# Patient Record
Sex: FEMALE | Race: WHITE | NOT HISPANIC OR LATINO | Employment: FULL TIME | URBAN - METROPOLITAN AREA
[De-identification: names, ages, dates, MRNs, and addresses within clinical notes are randomized per-mention and may not be internally consistent; named-entity substitution may affect disease eponyms.]

---

## 2021-02-17 ENCOUNTER — IMMUNIZATION (OUTPATIENT)
Dept: VACCINE CLINIC | Facility: HOSPITAL | Age: 46
End: 2021-02-17

## 2021-02-17 PROCEDURE — 91300 HC SARSCOV02 VAC 30MCG/0.3ML IM: CPT | Performed by: INTERNAL MEDICINE

## 2021-02-17 PROCEDURE — 0001A: CPT | Performed by: INTERNAL MEDICINE

## 2021-03-10 ENCOUNTER — IMMUNIZATION (OUTPATIENT)
Dept: VACCINE CLINIC | Facility: HOSPITAL | Age: 46
End: 2021-03-10

## 2021-03-10 PROCEDURE — 91300 HC SARSCOV02 VAC 30MCG/0.3ML IM: CPT | Performed by: INTERNAL MEDICINE

## 2021-03-10 PROCEDURE — 0002A: CPT | Performed by: INTERNAL MEDICINE

## 2021-09-28 ENCOUNTER — OFFICE VISIT (OUTPATIENT)
Dept: FAMILY MEDICINE CLINIC | Facility: CLINIC | Age: 46
End: 2021-09-28

## 2021-09-28 VITALS
WEIGHT: 121.4 LBS | SYSTOLIC BLOOD PRESSURE: 138 MMHG | HEART RATE: 63 BPM | OXYGEN SATURATION: 99 % | TEMPERATURE: 98.6 F | BODY MASS INDEX: 20.73 KG/M2 | DIASTOLIC BLOOD PRESSURE: 90 MMHG | HEIGHT: 64 IN

## 2021-09-28 DIAGNOSIS — G43.909 MIGRAINE SYNDROME: ICD-10-CM

## 2021-09-28 DIAGNOSIS — H57.02 ANISOCORIA: ICD-10-CM

## 2021-09-28 DIAGNOSIS — Z30.41 ENCOUNTER FOR SURVEILLANCE OF CONTRACEPTIVE PILLS: ICD-10-CM

## 2021-09-28 DIAGNOSIS — Z86.39 HISTORY OF VITAMIN D DEFICIENCY: Primary | ICD-10-CM

## 2021-09-28 PROBLEM — Z30.40 CONTRACEPTIVE SURVEILLANCE: Status: ACTIVE | Noted: 2017-03-31

## 2021-09-28 PROCEDURE — 99204 OFFICE O/P NEW MOD 45 MIN: CPT | Performed by: FAMILY MEDICINE

## 2021-09-28 RX ORDER — ACETAMINOPHEN 325 MG/1
650 TABLET ORAL
COMMUNITY
Start: 2021-08-14

## 2021-09-28 RX ORDER — AMLODIPINE BESYLATE 5 MG/1
5 TABLET ORAL DAILY
COMMUNITY
Start: 2021-08-12

## 2021-09-28 RX ORDER — MULTIVIT-MIN/IRON/FOLIC ACID/K 18-600-40
CAPSULE ORAL
COMMUNITY

## 2021-09-28 RX ORDER — SUMATRIPTAN 50 MG/1
50 TABLET, FILM COATED ORAL
COMMUNITY
Start: 2020-12-31

## 2021-09-28 RX ORDER — TOPIRAMATE 100 MG/1
TABLET, FILM COATED ORAL
COMMUNITY
Start: 2021-08-16

## 2021-09-28 RX ORDER — LEVONORGESTREL AND ETHINYL ESTRADIOL 0.1-0.02MG
KIT ORAL
COMMUNITY

## 2021-09-28 RX ORDER — VALACYCLOVIR HYDROCHLORIDE 500 MG/1
TABLET, FILM COATED ORAL
COMMUNITY
Start: 2021-08-08

## 2021-09-28 NOTE — PROGRESS NOTES
"Subjective   Shannen Oseguera is a 46 y.o. female.     Chief Complaint   Patient presents with   • Establish Care       History of Present Illness   Vitamin D deficiency-she is on replacement.    Migraines-is on preventative with Topamax and amlodipine and uses Imitrex as needed.  Follows with neurology.  Has 1 headache a month or less now.    Gets well visits and contraceptives with GYN and is up-to-date. She just had her mamm.     Is being followed by ophthalmology.     The following portions of the patient's history were reviewed and updated as appropriate: allergies, current medications, past family history, past medical history, past social history, past surgical history and problem list.    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       Past Surgical History:   Procedure Laterality Date   • NO PAST SURGERIES         Family History   Problem Relation Age of Onset   • Cancer Mother         Breast cancer   • Hyperlipidemia Mother    • Other Father         Epilepsy       Social History     Socioeconomic History   • Marital status:      Spouse name: Not on file   • Number of children: Not on file   • Years of education: Not on file   • Highest education level: Not on file   Tobacco Use   • Smoking status: Never Smoker   • Smokeless tobacco: Never Used   Substance and Sexual Activity   • Alcohol use: Not Currently     Alcohol/week: 0.0 standard drinks     Comment: Very rarely and if so it is one drinkto be social.   • Drug use: Never   • Sexual activity: Yes     Partners: Male     Birth control/protection: OCP       Review of Systems   Constitutional: Negative for fever.   Respiratory: Negative for shortness of breath.        Objective   Visit Vitals  /90 (BP Location: Left arm, Patient Position: Sitting)   Pulse 63   Temp 98.6 °F (37 °C)   Ht 162.6 cm (64\")   Wt 55.1 kg (121 lb 6.4 oz) "   SpO2 99%   BMI 20.84 kg/m²     Body mass index is 20.84 kg/m².  Physical Exam  Constitutional:       Appearance: Normal appearance. She is well-developed.   Cardiovascular:      Rate and Rhythm: Normal rate and regular rhythm.      Heart sounds: Normal heart sounds.   Pulmonary:      Effort: Pulmonary effort is normal.      Breath sounds: Normal breath sounds.   Musculoskeletal:         General: No swelling. Normal range of motion.   Skin:     General: Skin is warm and dry.      Findings: No rash.   Neurological:      General: No focal deficit present.      Mental Status: She is alert and oriented to person, place, and time.   Psychiatric:         Mood and Affect: Mood normal.         Behavior: Behavior normal.           Assessment/Plan   Diagnoses and all orders for this visit:    1. History of vitamin D deficiency (Primary)  -     Comprehensive Metabolic Panel  -     Vitamin D 25 Hydroxy    2. Migraine syndrome    3. Anisocoria    4. Encounter for surveillance of contraceptive pills      Doing well, cont meds, f/u as needed.

## 2021-09-29 LAB
25(OH)D3+25(OH)D2 SERPL-MCNC: 63.7 NG/ML (ref 30–100)
ALBUMIN SERPL-MCNC: 4.2 G/DL (ref 3.5–5.2)
ALBUMIN/GLOB SERPL: 1.8 G/DL
ALP SERPL-CCNC: 73 U/L (ref 39–117)
ALT SERPL-CCNC: 11 U/L (ref 1–33)
AST SERPL-CCNC: 15 U/L (ref 1–32)
BILIRUB SERPL-MCNC: 0.3 MG/DL (ref 0–1.2)
BUN SERPL-MCNC: 14 MG/DL (ref 6–20)
BUN/CREAT SERPL: 13.7 (ref 7–25)
CALCIUM SERPL-MCNC: 9 MG/DL (ref 8.6–10.5)
CHLORIDE SERPL-SCNC: 111 MMOL/L (ref 98–107)
CO2 SERPL-SCNC: 21.8 MMOL/L (ref 22–29)
CREAT SERPL-MCNC: 1.02 MG/DL (ref 0.57–1)
GLOBULIN SER CALC-MCNC: 2.4 GM/DL
GLUCOSE SERPL-MCNC: 84 MG/DL (ref 65–99)
POTASSIUM SERPL-SCNC: 3.8 MMOL/L (ref 3.5–5.2)
PROT SERPL-MCNC: 6.6 G/DL (ref 6–8.5)
SODIUM SERPL-SCNC: 143 MMOL/L (ref 136–145)

## 2022-11-14 ENCOUNTER — HOSPITAL ENCOUNTER (OUTPATIENT)
Dept: GENERAL RADIOLOGY | Facility: HOSPITAL | Age: 47
Discharge: HOME | End: 2022-11-14
Admitting: UROLOGY

## 2022-11-14 DIAGNOSIS — N20.0 CALCULUS, RENAL: ICD-10-CM

## 2022-11-14 PROCEDURE — 74018 RADEX ABDOMEN 1 VIEW: CPT

## 2022-12-05 ENCOUNTER — LAB (OUTPATIENT)
Dept: LAB | Facility: HOSPITAL | Age: 47
End: 2022-12-05

## 2022-12-05 ENCOUNTER — TRANSCRIBE ORDERS (OUTPATIENT)
Dept: LAB | Facility: HOSPITAL | Age: 47
End: 2022-12-05

## 2022-12-05 DIAGNOSIS — R31.21 ASYMPTOMATIC MICROSCOPIC HEMATURIA: ICD-10-CM

## 2022-12-05 DIAGNOSIS — R31.21 ASYMPTOMATIC MICROSCOPIC HEMATURIA: Primary | ICD-10-CM

## 2022-12-05 LAB
ANION GAP SERPL CALCULATED.3IONS-SCNC: 7.2 MMOL/L (ref 5–15)
BASOPHILS # BLD AUTO: 0.05 10*3/MM3 (ref 0–0.2)
BASOPHILS NFR BLD AUTO: 0.6 % (ref 0–1.5)
BUN SERPL-MCNC: 13 MG/DL (ref 6–20)
BUN/CREAT SERPL: 12.6 (ref 7–25)
CALCIUM SPEC-SCNC: 8.9 MG/DL (ref 8.6–10.5)
CHLORIDE SERPL-SCNC: 107 MMOL/L (ref 98–107)
CO2 SERPL-SCNC: 24.8 MMOL/L (ref 22–29)
CREAT SERPL-MCNC: 1.03 MG/DL (ref 0.57–1)
DEPRECATED RDW RBC AUTO: 39.4 FL (ref 37–54)
EGFRCR SERPLBLD CKD-EPI 2021: 66.8 ML/MIN/1.73
EOSINOPHIL # BLD AUTO: 0.14 10*3/MM3 (ref 0–0.4)
EOSINOPHIL NFR BLD AUTO: 1.7 % (ref 0.3–6.2)
ERYTHROCYTE [DISTWIDTH] IN BLOOD BY AUTOMATED COUNT: 11.6 % (ref 12.3–15.4)
GLUCOSE SERPL-MCNC: 88 MG/DL (ref 65–99)
HCT VFR BLD AUTO: 40.2 % (ref 34–46.6)
HGB BLD-MCNC: 13.5 G/DL (ref 12–15.9)
IMM GRANULOCYTES # BLD AUTO: 0.02 10*3/MM3 (ref 0–0.05)
IMM GRANULOCYTES NFR BLD AUTO: 0.2 % (ref 0–0.5)
LYMPHOCYTES # BLD AUTO: 2.79 10*3/MM3 (ref 0.7–3.1)
LYMPHOCYTES NFR BLD AUTO: 33.3 % (ref 19.6–45.3)
MCH RBC QN AUTO: 31.5 PG (ref 26.6–33)
MCHC RBC AUTO-ENTMCNC: 33.6 G/DL (ref 31.5–35.7)
MCV RBC AUTO: 93.9 FL (ref 79–97)
MONOCYTES # BLD AUTO: 0.58 10*3/MM3 (ref 0.1–0.9)
MONOCYTES NFR BLD AUTO: 6.9 % (ref 5–12)
NEUTROPHILS NFR BLD AUTO: 4.79 10*3/MM3 (ref 1.7–7)
NEUTROPHILS NFR BLD AUTO: 57.3 % (ref 42.7–76)
NRBC BLD AUTO-RTO: 0 /100 WBC (ref 0–0.2)
PLATELET # BLD AUTO: 266 10*3/MM3 (ref 140–450)
PMV BLD AUTO: 11.8 FL (ref 6–12)
POTASSIUM SERPL-SCNC: 3.5 MMOL/L (ref 3.5–5.2)
RBC # BLD AUTO: 4.28 10*6/MM3 (ref 3.77–5.28)
SODIUM SERPL-SCNC: 139 MMOL/L (ref 136–145)
WBC NRBC COR # BLD: 8.37 10*3/MM3 (ref 3.4–10.8)

## 2022-12-05 PROCEDURE — 36415 COLL VENOUS BLD VENIPUNCTURE: CPT

## 2022-12-05 PROCEDURE — 85025 COMPLETE CBC W/AUTO DIFF WBC: CPT

## 2022-12-05 PROCEDURE — 80048 BASIC METABOLIC PNL TOTAL CA: CPT

## 2022-12-16 ENCOUNTER — HOSPITAL ENCOUNTER (OUTPATIENT)
Dept: GENERAL RADIOLOGY | Facility: HOSPITAL | Age: 47
Discharge: HOME | End: 2022-12-16
Admitting: UROLOGY

## 2022-12-16 DIAGNOSIS — N20.0 CALCULUS, RENAL: ICD-10-CM

## 2022-12-16 PROCEDURE — 74018 RADEX ABDOMEN 1 VIEW: CPT

## 2023-04-28 ENCOUNTER — OFFICE VISIT (OUTPATIENT)
Dept: FAMILY MEDICINE CLINIC | Facility: CLINIC | Age: 48
End: 2023-04-28
Payer: COMMERCIAL

## 2023-04-28 VITALS
OXYGEN SATURATION: 98 % | HEART RATE: 90 BPM | TEMPERATURE: 98.2 F | SYSTOLIC BLOOD PRESSURE: 133 MMHG | DIASTOLIC BLOOD PRESSURE: 83 MMHG | BODY MASS INDEX: 21.46 KG/M2 | WEIGHT: 125 LBS

## 2023-04-28 DIAGNOSIS — H57.02 ANISOCORIA: ICD-10-CM

## 2023-04-28 DIAGNOSIS — Z01.818 PREOP EXAMINATION: Primary | ICD-10-CM

## 2023-04-28 DIAGNOSIS — G43.909 MIGRAINE SYNDROME: ICD-10-CM

## 2023-04-28 DIAGNOSIS — Z86.39 HISTORY OF VITAMIN D DEFICIENCY: ICD-10-CM

## 2023-04-28 PROBLEM — G43.009 MIGRAINE WITHOUT AURA AND WITHOUT STATUS MIGRAINOSUS, NOT INTRACTABLE: Status: ACTIVE | Noted: 2023-01-25

## 2023-04-28 RX ORDER — BROMFENAC SODIUM 0.7 MG/ML
SOLUTION/ DROPS OPHTHALMIC
COMMUNITY
Start: 2023-04-08

## 2023-04-28 RX ORDER — SUMATRIPTAN 50 MG/1
50 TABLET, FILM COATED ORAL
COMMUNITY
Start: 2023-01-25

## 2023-04-28 RX ORDER — TOPIRAMATE 100 MG/1
100 TABLET, FILM COATED ORAL DAILY
COMMUNITY
Start: 2023-01-25

## 2023-04-28 RX ORDER — MOXIFLOXACIN 5 MG/ML
SOLUTION/ DROPS OPHTHALMIC
COMMUNITY
Start: 2023-03-28

## 2023-04-28 RX ORDER — MELATONIN
2000 DAILY
COMMUNITY
End: 2023-04-28

## 2023-04-28 NOTE — PROGRESS NOTES
"Chief Complaint  eye surgery clearance    Subjective        Shannen Oseguera presents to [facility]  History of Present Illness     Is having cataract surgery and working to relax her astigmatism. Scheduled for 5/11/23. Has not had surgery before.     Objective   Vital Signs:  /83 (BP Location: Right arm, Patient Position: Sitting, Cuff Size: Large Adult)   Pulse 90   Temp 98.2 °F (36.8 °C)   Wt 56.7 kg (125 lb)   SpO2 98%   BMI 21.46 kg/m²   Estimated body mass index is 21.46 kg/m² as calculated from the following:    Height as of 9/28/21: 162.6 cm (64\").    Weight as of this encounter: 56.7 kg (125 lb).       BMI is within normal parameters. No other follow-up for BMI required.      Physical Exam  Vitals and nursing note reviewed.   Constitutional:       General: She is not in acute distress.     Appearance: Normal appearance. She is not ill-appearing.   HENT:      Head: Normocephalic and atraumatic.      Nose: Nose normal.      Mouth/Throat:      Mouth: Mucous membranes are moist.   Eyes:      Extraocular Movements: Extraocular movements intact.      Conjunctiva/sclera: Conjunctivae normal.   Cardiovascular:      Rate and Rhythm: Normal rate and regular rhythm.      Heart sounds: Normal heart sounds. No murmur heard.    No gallop.   Pulmonary:      Effort: Pulmonary effort is normal. No respiratory distress.      Breath sounds: Normal breath sounds. No stridor. No wheezing, rhonchi or rales.   Chest:      Chest wall: No tenderness.   Skin:     General: Skin is warm and dry.   Neurological:      General: No focal deficit present.      Mental Status: She is alert and oriented to person, place, and time. Mental status is at baseline.   Psychiatric:         Mood and Affect: Mood normal.         Behavior: Behavior normal.        Result Review :                   Assessment and Plan   Diagnoses and all orders for this visit:    1. Preop examination (Primary)    2. " Anisocoria    3. Migraine syndrome    4. History of vitamin D deficiency    on amlodipine for migraine treatment in combo with topamax. Does not have recorded history of high blood pressure     See scanned copy of surgical clearance paperwork.  Surgical risk calculator - low risk.          Follow Up   No follow-ups on file.  Patient was given instructions and counseling regarding her condition or for health maintenance advice. Please see specific information pulled into the AVS if appropriate.

## 2024-07-15 ENCOUNTER — OFFICE VISIT (OUTPATIENT)
Dept: FAMILY MEDICINE CLINIC | Facility: CLINIC | Age: 49
End: 2024-07-15
Payer: COMMERCIAL

## 2024-07-15 VITALS
TEMPERATURE: 97.7 F | HEART RATE: 77 BPM | HEIGHT: 64 IN | DIASTOLIC BLOOD PRESSURE: 80 MMHG | BODY MASS INDEX: 21.68 KG/M2 | SYSTOLIC BLOOD PRESSURE: 120 MMHG | WEIGHT: 127 LBS | OXYGEN SATURATION: 99 %

## 2024-07-15 DIAGNOSIS — Z23 IMMUNIZATION DUE: ICD-10-CM

## 2024-07-15 DIAGNOSIS — Z00.00 HEALTHCARE MAINTENANCE: Primary | ICD-10-CM

## 2024-07-15 DIAGNOSIS — Z13.6 SCREENING FOR CARDIOVASCULAR CONDITION: ICD-10-CM

## 2024-07-15 DIAGNOSIS — Z86.39 HISTORY OF VITAMIN D DEFICIENCY: ICD-10-CM

## 2024-07-15 DIAGNOSIS — Z12.11 COLON CANCER SCREENING: ICD-10-CM

## 2024-07-15 DIAGNOSIS — Z11.59 ENCOUNTER FOR HEPATITIS C SCREENING TEST FOR LOW RISK PATIENT: ICD-10-CM

## 2024-07-15 PROCEDURE — 99396 PREV VISIT EST AGE 40-64: CPT | Performed by: FAMILY MEDICINE

## 2024-07-15 PROCEDURE — 90471 IMMUNIZATION ADMIN: CPT | Performed by: FAMILY MEDICINE

## 2024-07-15 PROCEDURE — 90750 HZV VACC RECOMBINANT IM: CPT | Performed by: FAMILY MEDICINE

## 2024-07-15 RX ORDER — KETOCONAZOLE 20 MG/ML
SHAMPOO TOPICAL
COMMUNITY
Start: 2024-03-29

## 2024-07-15 RX ORDER — CLINDAMYCIN PHOSPHATE 10 UG/ML
LOTION TOPICAL
COMMUNITY
Start: 2024-06-03

## 2024-07-15 NOTE — PROGRESS NOTES
"Shannen Oseguera is here today for an annual physical exam.     Eating a healthy diet. Exercising routinely.   Regular periods. Wears seat belt. Feels safe at home.   Sexual activity: yes  Birth control: ocp- no issues  Pregnancy:      PHQ-2 Depression Screening  Little interest or pleasure in doing things? 0-->not at all   Feeling down, depressed, or hopeless? 0-->not at all   PHQ-2 Total Score 0         I have reviewed the patient's medical, family, and social history in detail and updated the computerized patient record.    Screening history:  Colonoscopy - due  Mammogram- utd and gets with gyn, Pap/pelvic - utd- gets with gyn  Metabolic - due    Health Maintenance   Topic Date Due    COLORECTAL CANCER SCREENING  Never done    TDAP/TD VACCINES (1 - Tdap) Never done    HEPATITIS C SCREENING  Never done    ANNUAL PHYSICAL  Never done    PAP SMEAR  2021    COVID-19 Vaccine (5 - - season) 2023    ZOSTER VACCINE (2 of 2) 2024    INFLUENZA VACCINE  2024    MAMMOGRAM  10/03/2025    Pneumococcal Vaccine 0-64  Aged Out       Review of Systems   Constitutional:  Negative for fever.   HENT:  Negative for hearing loss.    Eyes:  Negative for visual disturbance.   Respiratory:  Negative for shortness of breath.    Cardiovascular:  Negative for chest pain.   Gastrointestinal:  Negative for constipation and diarrhea.   Genitourinary:  Negative for difficulty urinating.   Musculoskeletal:  Negative for arthralgias and myalgias.   Skin:  Negative for rash.   Hematological:  Does not bruise/bleed easily.   Psychiatric/Behavioral:  Negative for dysphoric mood.        /80 (BP Location: Left arm, Patient Position: Sitting, Cuff Size: Adult)   Pulse 77   Temp 97.7 °F (36.5 °C)   Ht 162.6 cm (64.02\")   Wt 57.6 kg (127 lb)   LMP 2024 (Approximate)   SpO2 99%   BMI 21.79 kg/m²      Physical Exam    Vital signs reviewed.  General appearance: No acute distress  Eyes: conjunctiva clear " without erythema; pupils equally round and reactive  ENT: external ears and nose normal; hearing normal, oropharynx clear  Neck: supple; no thyromegaly  CV: normal rate and rhythm; no peripheral edema  Respiratory: normal respiratory effort; lungs clear to auscultation bilaterally  MSK: normal gait and station; no focal joint deformity or swelling  Skin: no rash or wounds; normal turgor  Neuro: cranial nerves 2-12 grossly intact; normal sensation to light touch  Psych: mood and affect normal; recent and remote memory intact    No visits with results within 2 Week(s) from this visit.   Latest known visit with results is:   Lab on 12/05/2022   Component Date Value Ref Range Status    Glucose 12/05/2022 88  65 - 99 mg/dL Final    BUN 12/05/2022 13  6 - 20 mg/dL Final    Creatinine 12/05/2022 1.03 (H)  0.57 - 1.00 mg/dL Final    Sodium 12/05/2022 139  136 - 145 mmol/L Final    Potassium 12/05/2022 3.5  3.5 - 5.2 mmol/L Final    Chloride 12/05/2022 107  98 - 107 mmol/L Final    CO2 12/05/2022 24.8  22.0 - 29.0 mmol/L Final    Calcium 12/05/2022 8.9  8.6 - 10.5 mg/dL Final    BUN/Creatinine Ratio 12/05/2022 12.6  7.0 - 25.0 Final    Anion Gap 12/05/2022 7.2  5.0 - 15.0 mmol/L Final    eGFR 12/05/2022 66.8  >60.0 mL/min/1.73 Final    National Kidney Foundation and American Society of Nephrology (ASN) Task Force recommended calculation based on the Chronic Kidney Disease Epidemiology Collaboration (CKD-EPI) equation refit without adjustment for race.    WBC 12/05/2022 8.37  3.40 - 10.80 10*3/mm3 Final    RBC 12/05/2022 4.28  3.77 - 5.28 10*6/mm3 Final    Hemoglobin 12/05/2022 13.5  12.0 - 15.9 g/dL Final    Hematocrit 12/05/2022 40.2  34.0 - 46.6 % Final    MCV 12/05/2022 93.9  79.0 - 97.0 fL Final    MCH 12/05/2022 31.5  26.6 - 33.0 pg Final    MCHC 12/05/2022 33.6  31.5 - 35.7 g/dL Final    RDW 12/05/2022 11.6 (L)  12.3 - 15.4 % Final    RDW-SD 12/05/2022 39.4  37.0 - 54.0 fl Final    MPV 12/05/2022 11.8  6.0 - 12.0 fL  Final    Platelets 12/05/2022 266  140 - 450 10*3/mm3 Final    Neutrophil % 12/05/2022 57.3  42.7 - 76.0 % Final    Lymphocyte % 12/05/2022 33.3  19.6 - 45.3 % Final    Monocyte % 12/05/2022 6.9  5.0 - 12.0 % Final    Eosinophil % 12/05/2022 1.7  0.3 - 6.2 % Final    Basophil % 12/05/2022 0.6  0.0 - 1.5 % Final    Immature Grans % 12/05/2022 0.2  0.0 - 0.5 % Final    Neutrophils, Absolute 12/05/2022 4.79  1.70 - 7.00 10*3/mm3 Final    Lymphocytes, Absolute 12/05/2022 2.79  0.70 - 3.10 10*3/mm3 Final    Monocytes, Absolute 12/05/2022 0.58  0.10 - 0.90 10*3/mm3 Final    Eosinophils, Absolute 12/05/2022 0.14  0.00 - 0.40 10*3/mm3 Final    Basophils, Absolute 12/05/2022 0.05  0.00 - 0.20 10*3/mm3 Final    Immature Grans, Absolute 12/05/2022 0.02  0.00 - 0.05 10*3/mm3 Final    nRBC 12/05/2022 0.0  0.0 - 0.2 /100 WBC Final         Current Outpatient Medications:     amLODIPine (NORVASC) 5 MG tablet, Take 1 tablet by mouth Daily., Disp: , Rfl:     Cholecalciferol (Vitamin D) 50 MCG (2000 UT) capsule, , Disp: , Rfl:     clindamycin (CLEOCIN T) 1 % lotion, APPLY A THIN LAYER TO FACE DAILY, Disp: , Rfl:     ketoconazole (NIZORAL) 2 % shampoo, WASH SCALP 2-3 TIMES WEEKLY, Disp: , Rfl:     levonorgestrel-ethinyl estradiol (AVIANE,ALESSE,LESSINA) 0.1-20 MG-MCG per tablet, , Disp: , Rfl:     SUMAtriptan (IMITREX) 50 MG tablet, Take 1 tablet by mouth., Disp: , Rfl:     topiramate (TOPAMAX) 100 MG tablet, Take 1 tablet by mouth Daily., Disp: , Rfl:     valACYclovir (VALTREX) 500 MG tablet, , Disp: , Rfl:     Diagnoses and all orders for this visit:    1. Healthcare maintenance (Primary)    2. Encounter for hepatitis C screening test for low risk patient  -     Hepatitis C Antibody    3. Immunization due  -     Tdap Vaccine Greater Than or Equal To 6yo IM  -     Shingrix Vaccine    4. Screening for cardiovascular condition  -     Comprehensive Metabolic Panel  -     Lipid Panel    5. Colon cancer screening    6. History of vitamin D  deficiency  -     Vitamin D,25-Hydroxy        Encourage healthy diet and exercise.  Encourage patient to stay up to date on screening examinations as indicated based on age and risk factors. F/U yearly.     Pt thinks she is utd on cologuard. She will call if she is not and we will put the order in.

## 2024-07-16 LAB
25(OH)D3+25(OH)D2 SERPL-MCNC: 64.7 NG/ML (ref 30–100)
ALBUMIN SERPL-MCNC: 4.4 G/DL (ref 3.5–5.2)
ALBUMIN/GLOB SERPL: 1.7 G/DL
ALP SERPL-CCNC: 79 U/L (ref 39–117)
ALT SERPL-CCNC: 12 U/L (ref 1–33)
AST SERPL-CCNC: 18 U/L (ref 1–32)
BILIRUB SERPL-MCNC: 0.4 MG/DL (ref 0–1.2)
BUN SERPL-MCNC: 14 MG/DL (ref 6–20)
BUN/CREAT SERPL: 12 (ref 7–25)
CALCIUM SERPL-MCNC: 8.9 MG/DL (ref 8.6–10.5)
CHLORIDE SERPL-SCNC: 107 MMOL/L (ref 98–107)
CHOLEST SERPL-MCNC: 185 MG/DL (ref 0–200)
CO2 SERPL-SCNC: 22.4 MMOL/L (ref 22–29)
CREAT SERPL-MCNC: 1.17 MG/DL (ref 0.57–1)
EGFRCR SERPLBLD CKD-EPI 2021: 57 ML/MIN/1.73
GLOBULIN SER CALC-MCNC: 2.6 GM/DL
GLUCOSE SERPL-MCNC: 88 MG/DL (ref 65–99)
HCV IGG SERPL QL IA: NON REACTIVE
HDLC SERPL-MCNC: 55 MG/DL (ref 40–60)
LDLC SERPL CALC-MCNC: 109 MG/DL (ref 0–100)
POTASSIUM SERPL-SCNC: 3.8 MMOL/L (ref 3.5–5.2)
PROT SERPL-MCNC: 7 G/DL (ref 6–8.5)
SODIUM SERPL-SCNC: 140 MMOL/L (ref 136–145)
TRIGL SERPL-MCNC: 118 MG/DL (ref 0–150)
VLDLC SERPL CALC-MCNC: 21 MG/DL (ref 5–40)

## 2024-08-19 ENCOUNTER — PROCEDURE VISIT (OUTPATIENT)
Age: 49
End: 2024-08-19
Payer: COMMERCIAL

## 2024-08-19 VITALS — BODY MASS INDEX: 21.85 KG/M2 | HEIGHT: 64 IN | WEIGHT: 128 LBS

## 2024-08-19 DIAGNOSIS — I78.1 SPIDER VEINS: Primary | ICD-10-CM

## 2024-08-19 PROCEDURE — COS224 VENOUS SCLEROTHERAPY: Performed by: NURSE PRACTITIONER

## 2024-08-19 NOTE — PROGRESS NOTES
Procedure   Venous Sclerotherapy    Date/Time: 8/19/2024 3:28 PM    Performed by: Cyndee Shaw APRN  Authorized by: Cyndee Shaw APRN    Procedure:  Right single/ multiple injections of sclerosant, spider veins; limb or trunk, Left single/ multiple injections of sclerosant, spider veins; limb or trunk and Right single/ multiple injections of sclerosant, spider veins; limb  Indications: spider veins    The procedure was performed for cosmetic reasons.       Risks discussed:  Anaphylaxis, bleeding, deep venous thrombosis, failure to clear vein, hyper/ hypopigmentation, matting, neuropathy, infection, parethesias, skin staining, ulceration, stroke, poor cosmetic result and pain    Anesthesia method:  None    Preparation: Patient was prepped and draped in usual sterile fashion      Sclerosant: polidocanol    Sclerosant site:  Bilateral leg  Polidocanol concentration:  0.6%  Sclerosant amount (ml):  8  Specimen:  None    Procedure Description:   The skin was prepped with alcohol. A 32 gauge needle was placed in the varix on negative tension and then confirmed in position. The sclerosing solution was injected with confirmation of intravenous position with a low pressure injection. There was appropriate blanching of the varices. Cotton balls and paper tape were placed over the injection sites.       Procedure completion:  Tolerated well, no complications  Dressing: cotton balls and paper tape applied

## 2024-08-22 ENCOUNTER — TELEPHONE (OUTPATIENT)
Age: 49
End: 2024-08-22
Payer: COMMERCIAL

## 2024-08-22 NOTE — TELEPHONE ENCOUNTER
Patient had a sclerotherapy procedure done on 08.19.2024. She stated that she wore the compression socks for 2 days like told and took them off yesterday. She stated that when she took them off there were some parts of the skin that came off with it. They are both about the size of a quarter. There is one on the top of the foot that is red and there is one on the side of the ankle that is starting to weep and it burns if anything touches it. Patient currently has band aids on the open wounds and wants to know what would be the best way to treat them. She stated that she will not put any ointments on until she is told what is best.

## 2024-08-23 NOTE — TELEPHONE ENCOUNTER
Patient called back regarding previous telephone encounter. Requesting a call back at earliest convenience.

## 2024-08-28 ENCOUNTER — OFFICE VISIT (OUTPATIENT)
Age: 49
End: 2024-08-28
Payer: COMMERCIAL

## 2024-08-28 VITALS — BODY MASS INDEX: 21.85 KG/M2 | HEIGHT: 64 IN | WEIGHT: 128 LBS

## 2024-08-28 DIAGNOSIS — I78.1 SPIDER VEINS: ICD-10-CM

## 2024-08-28 DIAGNOSIS — L81.9 HYPERPIGMENTATION: ICD-10-CM

## 2024-08-28 DIAGNOSIS — T24.432A: Primary | ICD-10-CM

## 2024-08-28 NOTE — PROGRESS NOTES
"Chief Complaint  Injection Check    Subjective      History of Present Illness  Shannen Oseguera is status post injection sclerotherapy performed on 8/19/2024.  She has 2 wounds to her ankle.    Past History:  Medical History: has a past medical history of Allergic (penicillin), Cataract, Headache (2003), Kidney stone (2022), and Pneumonia (1991).   Surgical History: has a past surgical history that includes No past surgeries.   Family History: family history includes Cancer in her mother; Hyperlipidemia in her mother; Other in her father.   Social History: reports that she has never smoked. She has never used smokeless tobacco. She reports that she does not currently use alcohol. She reports that she does not use drugs.    (Not in a hospital admission)     Allergies: Penicillins, Augmentin [amoxicillin-pot clavulanate], and Biaxin [clarithromycin]     Shannen Oseguera  reports that she has never smoked. She has never used smokeless tobacco..       Objective   Vital Signs:  Ht 162.6 cm (64.02\")   Wt 58.1 kg (128 lb)   BMI 21.96 kg/m²   BMI is within normal parameters. No other follow-up for BMI required.              Assessment and Plan   Diagnoses and all orders for this visit:    1. Spider veins (Primary)  Assessment & Plan:  Patient is s/p injection sclerotherapy performed on 8/19/2024 in which I used 12 cc of 0.6% polidocanol to address bilateral lower extremity reticular and telangiectasia veins.  She returns today for injection check.  Many of the treated veins have thrombosed nicely.  She does have some early hyperpigmentation to several of the areas treated.  Nothing to drain today.  Unfortunately, she has developed 2 ulcerations to her left ankle, one is anteriorly and the other 1 is posterior lateral.  No signs or symptoms of infection.  I have given her prescription Silvadene today to apply to the 2 areas.  She is to continue wearing the compression stockings for a total of 2 weeks.  I will see her " back in a 3-week follow-up to reevaluate.  We will also do laser treatment at that time to bilateral lower extremity brown dyschromia.  I will look at the hyperpigmentation on the veins as well as the wounds (should she have a scar ) to see if we are able to laser those as well.    Follow Up     Return in about 3 weeks (around 9/18/2024) for artem Cotter 15 min, wound check.  Patient was given instructions and counseling regarding her condition or for health maintenance advice. Please see specific information pulled into the AVS if appropriate.

## 2024-09-18 ENCOUNTER — PROCEDURE VISIT (OUTPATIENT)
Age: 49
End: 2024-09-18
Payer: COMMERCIAL

## 2024-09-18 VITALS — BODY MASS INDEX: 21.85 KG/M2 | WEIGHT: 128 LBS | HEIGHT: 64 IN

## 2024-09-18 DIAGNOSIS — I78.1 SPIDER VEINS: Primary | ICD-10-CM

## 2024-09-18 DIAGNOSIS — L81.9 HYPERPIGMENTATION: ICD-10-CM

## 2024-09-18 PROCEDURE — 99213 OFFICE O/P EST LOW 20 MIN: CPT | Performed by: NURSE PRACTITIONER

## 2024-10-01 ENCOUNTER — TELEPHONE (OUTPATIENT)
Dept: FAMILY MEDICINE CLINIC | Facility: CLINIC | Age: 49
End: 2024-10-01
Payer: COMMERCIAL

## 2024-10-01 NOTE — TELEPHONE ENCOUNTER
f-[phone]    Requesting an order for a mammogram be faxed to number above.  Please advise    [facility]

## 2024-10-02 DIAGNOSIS — Z12.31 VISIT FOR SCREENING MAMMOGRAM: Primary | ICD-10-CM

## 2024-10-02 NOTE — TELEPHONE ENCOUNTER
Ary from [facility] called for the order to be faxed to them at [phone]. The order has been faxed.

## 2024-10-04 DIAGNOSIS — Z12.31 VISIT FOR SCREENING MAMMOGRAM: ICD-10-CM

## 2024-10-28 ENCOUNTER — OFFICE VISIT (OUTPATIENT)
Dept: OBSTETRICS AND GYNECOLOGY | Age: 49
End: 2024-10-28
Payer: COMMERCIAL

## 2024-10-28 VITALS
WEIGHT: 123 LBS | SYSTOLIC BLOOD PRESSURE: 114 MMHG | BODY MASS INDEX: 21 KG/M2 | DIASTOLIC BLOOD PRESSURE: 64 MMHG | HEIGHT: 64 IN

## 2024-10-28 DIAGNOSIS — Z12.4 SCREENING FOR MALIGNANT NEOPLASM OF CERVIX: ICD-10-CM

## 2024-10-28 DIAGNOSIS — Z11.51 SCREENING FOR HUMAN PAPILLOMAVIRUS (HPV): ICD-10-CM

## 2024-10-28 DIAGNOSIS — Z30.41 ENCOUNTER FOR SURVEILLANCE OF CONTRACEPTIVE PILLS: ICD-10-CM

## 2024-10-28 DIAGNOSIS — Z01.419 WELL FEMALE EXAM WITH ROUTINE GYNECOLOGICAL EXAM: Primary | ICD-10-CM

## 2024-10-28 DIAGNOSIS — Z12.31 SCREENING MAMMOGRAM FOR BREAST CANCER: ICD-10-CM

## 2024-10-28 PROCEDURE — 99386 PREV VISIT NEW AGE 40-64: CPT

## 2024-10-28 RX ORDER — LEVONORGESTREL/ETHIN.ESTRADIOL 0.1-0.02MG
1 TABLET ORAL DAILY
Qty: 84 TABLET | Refills: 3 | Status: SHIPPED | OUTPATIENT
Start: 2024-10-28

## 2024-10-28 NOTE — PROGRESS NOTES
Subjective     History of Present Illness    Chief Complaint   Patient presents with    Gynecologic Exam     New gyn ae- last pap 2018 Neg, Hpv Neg, mammo 10/2/2024, cologaurd 10/14/2022       Shannen Oseguera is a 49 y.o. female who presents for annual exam.  New patient, here to establish care.   Doing well, no complaints.   Last annual exam in summer 2023. No hx abnormal paps.    On OCPs, she skips the placebo week for 2-3 months and then will have menses when she takes placebo pills. Gets hormonal migraines and menses suppression helps these. No hot flashes or night sweats. Mother went through menopause at age 55. Patient wants to wait until next year to check FSH.   Declines STD testing.  Colon cancer screening utd, Cologuard negative 10/5/2022.  Screening mammogram utd, negative 10/3/2024.   Follows with PCP, completes wellness labs with them.   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 (Menstrual status: Oral contraceptives).           Current contraception: OCP (estrogen/progesterone)  History of abnormal Pap smear: no  Received Gardasil immunization: no  Perform regular self breast exam: yes -    Family history of uterine or ovarian cancer: no  Family History of colon cancer: no  Family history of breast cancer: yes - mother dx 56 y/o    PAP: done today  Mammogram: up to date - negative 10/3/2024  Colonoscopy: up to date - cologuard negative 10/5/2022  DEXA: not indicated    Exercise: moderately active  Calcium/Vitamin D: adequate intake    The following portions of the patient's history were reviewed and updated as appropriate: allergies,  "current medications, past family history, past medical history, past social history, past surgical history, and problem list.    Review of Systems  A comprehensive review of systems was negative.       Objective   Physical Exam    /64   Ht 162.6 cm (64.02\")   Wt 55.8 kg (123 lb)   BMI 21.10 kg/m²      General: alert, appears stated age, cooperative, and no distress   Heart: regular rate and rhythm, S1, S2 normal, no murmur, click, rub or gallop   Lungs: clear to auscultation bilaterally   Abdomen: soft, non-tender, without masses or organomegaly   Breast: inspection negative, no nipple discharge or bleeding, no masses or nodularity palpable   External genitalia/Vulva: External genitalia including bartholin's glands, Urethra, [place]'s gland and urethra meatus are normal and Bladder appears normal without significant prolapse    Vagina: normal mucosa, normal discharge   Cervix: no lesions   Uterus: normal size and non-tender   Adnexa: normal adnexa and no mass, fullness, tenderness   Neurologic: Alert and Oriented x3   Psychiatric: Normal affect, judgement, and mood       Assessment & Plan   Diagnoses and all orders for this visit:    1. Well female exam with routine gynecological exam (Primary)  -     IGP, Apt HPV,rfx 16 / 18,45    2. Screening for human papillomavirus (HPV)  -     IGP, Apt HPV,rfx 16 / 18,45    3. Screening for malignant neoplasm of cervix  -     IGP, Apt HPV,rfx 16 / 18,45    4. Screening mammogram for breast cancer  -     Mammo Screening Digital Tomosynthesis Bilateral With CAD; Future    5. Encounter for surveillance of contraceptive pills  -     levonorgestrel-ethinyl estradiol (AVIANE,ALESSE,LESSINA) 0.1-20 MG-MCG per tablet; Take 1 tablet by mouth Daily.  Dispense: 84 tablet; Refill: 3          PAP smear with HPV co-testing completed today  Screening mammogram for 2025 ordered  OCP refills sent  Will call patient with results and treat accordingly.   All questions answered.  Breast " self exam technique reviewed and patient encouraged to perform self-exam monthly.  Physical activity and regular exercise encouraged.  Discussed healthy lifestyle modifications.  Discussed calcium and vitamin D needs to prevent osteoporosis.  Contraception discussed  Discussed with the patient checking FSH after completing placebo week or stopping OCPs for 1 week.  Patient still having regular menses when she takes placebo pills and would like to wait until next year to check FSH.  Informed patient to call if she is not getting menses during placebo pills or she begins to have hot flashes /night sweats.  Patient agreeable to plan, OCP refill sent.    Return in 1 year (on 10/28/2025) for Annual exam.

## 2024-10-30 ENCOUNTER — PATIENT ROUNDING (BHMG ONLY) (OUTPATIENT)
Dept: OBSTETRICS AND GYNECOLOGY | Age: 49
End: 2024-10-30
Payer: COMMERCIAL

## 2024-11-02 LAB
CYTOLOGIST CVX/VAG CYTO: NORMAL
CYTOLOGY CVX/VAG DOC CYTO: NORMAL
CYTOLOGY CVX/VAG DOC THIN PREP: NORMAL
DX ICD CODE: NORMAL
HPV I/H RISK 4 DNA CVX QL PROBE+SIG AMP: NEGATIVE
Lab: NORMAL
OTHER STN SPEC: NORMAL
STAT OF ADQ CVX/VAG CYTO-IMP: NORMAL

## 2025-02-11 ENCOUNTER — TELEPHONE (OUTPATIENT)
Age: 50
End: 2025-02-11

## 2025-02-11 NOTE — TELEPHONE ENCOUNTER
Caller: Shannen Oseguera    Relationship: Self    Best call back number: [phone]    What is the best time to reach you: ANY    Who are you requesting to speak with (clinical staff, provider,  specific staff member): CLINICAL    Do you know the name of the person who called: PT    What was the call regarding: PT WAS PREVIOUSLY SEEN BY MONICA KINNEY AND WOULD LIKE TO SEE IF SHE CAN GET IN TO BE SEEN BY SOMEONE ELSE IN OUR OFFICE. PLEASE GIVE HER A CALL BACK TO DISCUSS FURTHER. THANK YOU    Is it okay if the provider responds through [employer]: NO         [place] Counseling: I discussed with the patient the risks of tildrakizumab including but not limited to immunosuppression, malignancy, posterior leukoencephalopathy syndrome, and serious infections.  The patient understands that monitoring is required including a PPD at baseline and must alert us or the primary physician if symptoms of infection or other concerning signs are noted.

## 2025-02-19 ENCOUNTER — TELEPHONE (OUTPATIENT)
Age: 50
End: 2025-02-19
Payer: COMMERCIAL

## 2025-02-19 NOTE — TELEPHONE ENCOUNTER
Patient called in wanting appointment. She last saw JANNY Garrett in 09/2024.  When I told her it would be May before I could give her an appointment, she changed her mind and did not want to come.

## 2025-08-27 ENCOUNTER — OFFICE VISIT (OUTPATIENT)
Dept: FAMILY MEDICINE CLINIC | Facility: CLINIC | Age: 50
End: 2025-08-27
Payer: COMMERCIAL

## 2025-08-27 VITALS
DIASTOLIC BLOOD PRESSURE: 72 MMHG | HEIGHT: 64 IN | BODY MASS INDEX: 20.49 KG/M2 | SYSTOLIC BLOOD PRESSURE: 112 MMHG | WEIGHT: 120 LBS | HEART RATE: 74 BPM | OXYGEN SATURATION: 98 %

## 2025-08-27 DIAGNOSIS — Z23 ENCOUNTER FOR IMMUNIZATION: ICD-10-CM

## 2025-08-27 DIAGNOSIS — Z86.39 HISTORY OF VITAMIN D DEFICIENCY: ICD-10-CM

## 2025-08-27 DIAGNOSIS — Z30.41 ENCOUNTER FOR SURVEILLANCE OF CONTRACEPTIVE PILLS: ICD-10-CM

## 2025-08-27 DIAGNOSIS — Z00.00 HEALTHCARE MAINTENANCE: Primary | ICD-10-CM

## 2025-08-27 DIAGNOSIS — G43.009 MIGRAINE WITHOUT AURA AND WITHOUT STATUS MIGRAINOSUS, NOT INTRACTABLE: ICD-10-CM

## 2025-08-27 DIAGNOSIS — R94.4 DECREASED GFR: ICD-10-CM

## 2025-08-27 PROBLEM — T24.432A: Status: RESOLVED | Noted: 2024-08-28 | Resolved: 2025-08-27

## 2025-08-27 PROBLEM — G43.909 MIGRAINE SYNDROME: Status: RESOLVED | Noted: 2021-09-28 | Resolved: 2025-08-27

## 2025-08-28 LAB
ALBUMIN SERPL-MCNC: 4.4 G/DL (ref 3.8–4.9)
ALP SERPL-CCNC: 72 IU/L (ref 44–121)
ALT SERPL-CCNC: 16 IU/L (ref 0–32)
AST SERPL-CCNC: 20 IU/L (ref 0–40)
BILIRUB SERPL-MCNC: 0.2 MG/DL (ref 0–1.2)
BUN SERPL-MCNC: 13 MG/DL (ref 6–24)
BUN/CREAT SERPL: 11 (ref 9–23)
CALCIUM SERPL-MCNC: 9.2 MG/DL (ref 8.7–10.2)
CHLORIDE SERPL-SCNC: 108 MMOL/L (ref 96–106)
CHOLEST SERPL-MCNC: 185 MG/DL (ref 100–199)
CO2 SERPL-SCNC: 22 MMOL/L (ref 20–29)
CREAT SERPL-MCNC: 1.23 MG/DL (ref 0.57–1)
EGFRCR SERPLBLD CKD-EPI 2021: 53 ML/MIN/1.73
GLOBULIN SER CALC-MCNC: 2.8 G/DL (ref 1.5–4.5)
GLUCOSE SERPL-MCNC: 96 MG/DL (ref 70–99)
HDLC SERPL-MCNC: 57 MG/DL
LDLC SERPL CALC-MCNC: 113 MG/DL (ref 0–99)
POTASSIUM SERPL-SCNC: 3.2 MMOL/L (ref 3.5–5.2)
PROT SERPL-MCNC: 7.2 G/DL (ref 6–8.5)
SODIUM SERPL-SCNC: 144 MMOL/L (ref 134–144)
TRIGL SERPL-MCNC: 82 MG/DL (ref 0–149)
VLDLC SERPL CALC-MCNC: 15 MG/DL (ref 5–40)